# Patient Record
(demographics unavailable — no encounter records)

---

## 2025-07-10 NOTE — PHYSICAL EXAM
[Complete] : complete cerumen impaction [Mild] : mild left inferior turbinate hypertrophy [Normal muscle strength, symmetry and tone of facial, head and neck musculature] : normal muscle strength, symmetry and tone of facial, head and neck musculature [Normal] : no cervical lymphadenopathy [2+] : 2+ [Increased Work of Breathing] : no increased work of breathing with use of accessory muscles and retractions

## 2025-07-10 NOTE — HISTORY OF PRESENT ILLNESS
[No change in the review of systems as noted in prior visit date ___] : No change in the review of systems as noted in prior visit date of [unfilled] [de-identified] : 12 year old with chronic nasal congestion  On Flonase for most of the year with good benefit   No snoring at night   2 upper respiratory infections 1 ear infection in December   No hearing concerns  No speech delay Hearing normal 11/2024

## 2025-07-10 NOTE — PROCEDURE
[Flexible Scope  (R)] : Flexible Scope (R) [Flexible Scope  (L)] : Flexible Scope (L) [None] : None [FreeTextEntry1] :  Bilateral Cerumen Impaction  [FreeTextEntry2] :  Bilateral Cerumen Impaction  [FreeTextEntry3] : PROCEDURE:  Bilateral Cerumen Removal   After informed verbal consent is obtained, binocular microscopy is used to remove cerumen from the left ear canal with a curette and suction.   After informed verbal consent is obtained, binocular microscopy is used to remove cerumen from the right ear canal with a curette and suction.

## 2025-07-10 NOTE — CONSULT LETTER
[Courtesy Letter:] : I had the pleasure of seeing your patient, [unfilled], in my office today. [Sincerely,] : Sincerely, [FreeTextEntry2] : [facility] [address] [place] [address] [FreeTextEntry3] : Richard Westbrook MD Chief, Pediatric Otolaryngology [facility] and Gianna Quiroz [facility] Professor of Otolaryngology [facility] [employer] at [facility]